# Patient Record
Sex: MALE | Race: WHITE | NOT HISPANIC OR LATINO | URBAN - METROPOLITAN AREA
[De-identification: names, ages, dates, MRNs, and addresses within clinical notes are randomized per-mention and may not be internally consistent; named-entity substitution may affect disease eponyms.]

---

## 2022-03-23 ENCOUNTER — CLAIMS CREATED FROM THE CLAIM WINDOW (OUTPATIENT)
Dept: URBAN - METROPOLITAN AREA CLINIC 94 | Facility: CLINIC | Age: 22
End: 2022-03-23
Payer: COMMERCIAL

## 2022-03-23 ENCOUNTER — WEB ENCOUNTER (OUTPATIENT)
Dept: URBAN - METROPOLITAN AREA CLINIC 94 | Facility: CLINIC | Age: 22
End: 2022-03-23

## 2022-03-23 VITALS
HEIGHT: 76 IN | WEIGHT: 188 LBS | HEART RATE: 64 BPM | DIASTOLIC BLOOD PRESSURE: 77 MMHG | SYSTOLIC BLOOD PRESSURE: 140 MMHG | BODY MASS INDEX: 22.89 KG/M2 | TEMPERATURE: 97.7 F

## 2022-03-23 DIAGNOSIS — K52.9 POSTPRANDIAL DIARRHEA: ICD-10-CM

## 2022-03-23 PROCEDURE — 99203 OFFICE O/P NEW LOW 30 MIN: CPT | Performed by: PHYSICIAN ASSISTANT

## 2022-03-23 PROCEDURE — 99203 OFFICE O/P NEW LOW 30 MIN: CPT | Performed by: INTERNAL MEDICINE

## 2022-03-23 NOTE — HPI-TODAY'S VISIT:
20 yo M evaluated today for hx of abdominal pain with diarrhea.   Pt reports of hx of post prandial urgency with diarrhea or soft stool. He has at least 2 BMs/day. Pt denies melena or hematochezia.  He feels that sx are triggered by certain foods such as fried chicken specifically [id number]. He has been lactose intolerant for the past 2 yrs. He is very strict with avoiding cheese/all dairy products  Pt does report some unintentional wt loss. He denies any upper GI Sx of GERD, N/V or dysphagia.   Pt has hx of seasonal and environmental allergies. No asthma. Pt denies family hx of IBD.

## 2022-03-25 LAB
C-REACTIVE PROTEIN, QUANT: <1
ENDOMYSIAL ANTIBODY IGA: NEGATIVE
IMMUNOGLOBULIN A, QN, SERUM: 167
SEDIMENTATION RATE-WESTERGREN: 2
T-TRANSGLUTAMINASE (TTG) IGA: <2

## 2022-05-04 ENCOUNTER — OFFICE VISIT (OUTPATIENT)
Dept: URBAN - METROPOLITAN AREA CLINIC 94 | Facility: CLINIC | Age: 22
End: 2022-05-04

## 2022-05-04 VITALS — HEIGHT: 76 IN

## 2022-05-27 ENCOUNTER — OFFICE VISIT (OUTPATIENT)
Dept: URBAN - METROPOLITAN AREA CLINIC 94 | Facility: CLINIC | Age: 22
End: 2022-05-27
Payer: COMMERCIAL

## 2022-05-27 ENCOUNTER — DASHBOARD ENCOUNTERS (OUTPATIENT)
Age: 22
End: 2022-05-27

## 2022-05-27 ENCOUNTER — WEB ENCOUNTER (OUTPATIENT)
Dept: URBAN - METROPOLITAN AREA CLINIC 94 | Facility: CLINIC | Age: 22
End: 2022-05-27

## 2022-05-27 VITALS
SYSTOLIC BLOOD PRESSURE: 126 MMHG | WEIGHT: 179 LBS | HEART RATE: 83 BPM | BODY MASS INDEX: 21.8 KG/M2 | HEIGHT: 76 IN | DIASTOLIC BLOOD PRESSURE: 74 MMHG | TEMPERATURE: 97.5 F

## 2022-05-27 DIAGNOSIS — K52.9 POSTPRANDIAL DIARRHEA: ICD-10-CM

## 2022-05-27 PROCEDURE — 99213 OFFICE O/P EST LOW 20 MIN: CPT | Performed by: INTERNAL MEDICINE

## 2022-05-27 NOTE — HPI-TODAY'S VISIT:
20 yo M returns today for f/u visit with hx of post prandial urgency with diarrhea.   Since his last visit, patient had food allergy testing  with allergist which came back + for soy, rice, peaches, honeydew, tuna, and egg Pt has since avoided these foods  and GI sx have significantly improved. Pt reports improvement in post prandial urgency with now solid stools.   Pt previously with of hx of post prandial urgency with diarrhea or soft stool.He has been lactose intolerant for the past 2 yrs. He is very strict with avoiding cheese/all dairy products  He denies any upper GI Sx of GERD, N/V or dysphagia.   Pt has hx of seasonal and environmental allergies. No asthma. Pt denies family hx of IBD.